# Patient Record
Sex: MALE | URBAN - METROPOLITAN AREA
[De-identification: names, ages, dates, MRNs, and addresses within clinical notes are randomized per-mention and may not be internally consistent; named-entity substitution may affect disease eponyms.]

---

## 2023-03-28 ENCOUNTER — OFFICE (OUTPATIENT)
Facility: LOCATION | Age: 63
Setting detail: OPHTHALMOLOGY
End: 2023-03-28
Payer: COMMERCIAL

## 2023-03-28 ENCOUNTER — RX ONLY (RX ONLY)
Age: 63
End: 2023-03-28

## 2023-03-28 DIAGNOSIS — H40.1133: ICD-10-CM

## 2023-03-28 DIAGNOSIS — E10.9: ICD-10-CM

## 2023-03-28 DIAGNOSIS — E11.9: ICD-10-CM

## 2023-03-28 DIAGNOSIS — H25.13: ICD-10-CM

## 2023-03-28 PROCEDURE — 92020 GONIOSCOPY: CPT | Performed by: OPHTHALMOLOGY

## 2023-03-28 PROCEDURE — 92133 CPTRZD OPH DX IMG PST SGM ON: CPT | Performed by: OPHTHALMOLOGY

## 2023-03-28 PROCEDURE — 92083 EXTENDED VISUAL FIELD XM: CPT | Performed by: OPHTHALMOLOGY

## 2023-03-28 PROCEDURE — 99214 OFFICE O/P EST MOD 30 MIN: CPT | Performed by: OPHTHALMOLOGY

## 2023-03-28 ASSESSMENT — PACHYMETRY
OD_CT_CORRECTION: -1
OS_CT_CORRECTION: -1
OD_CT_UM: 562
OS_CT_UM: 563

## 2023-03-28 ASSESSMENT — REFRACTION_AUTOREFRACTION
OS_SPHERE: +1.75
OS_AXIS: 076
OD_CYLINDER: -4.75
OD_AXIS: 104
OD_SPHERE: +1.25
OS_CYLINDER: -6.50

## 2023-03-28 ASSESSMENT — REFRACTION_CURRENTRX
OD_OVR_VA: 20/
OS_OVR_VA: 20/

## 2023-03-28 ASSESSMENT — KERATOMETRY
OD_AXISANGLE_DEGREES: 104
OD_K2POWER_DIOPTERS: 43.50
OS_K1POWER_DIOPTERS: 48.00
OS_K2POWER_DIOPTERS: 43.50
OD_K1POWER_DIOPTERS: 46.75
OS_AXISANGLE_DEGREES: 075

## 2023-03-28 ASSESSMENT — SPHEQUIV_DERIVED
OD_SPHEQUIV: -1.125
OS_SPHEQUIV: -1.5

## 2023-03-28 ASSESSMENT — VISUAL ACUITY
OS_BCVA: 20/25-2
OD_BCVA: 20/40+1

## 2023-03-28 ASSESSMENT — AXIALLENGTH_DERIVED
OS_AL: 23.3523
OD_AL: 23.4344

## 2023-03-29 ENCOUNTER — RX ONLY (RX ONLY)
Age: 63
End: 2023-03-29

## 2023-03-29 ENCOUNTER — OFFICE (OUTPATIENT)
Facility: LOCATION | Age: 63
Setting detail: OPHTHALMOLOGY
End: 2023-03-29
Payer: COMMERCIAL

## 2023-03-29 DIAGNOSIS — H40.1133: ICD-10-CM

## 2023-03-29 DIAGNOSIS — E11.9: ICD-10-CM

## 2023-03-29 DIAGNOSIS — H25.13: ICD-10-CM

## 2023-03-29 DIAGNOSIS — E10.9: ICD-10-CM

## 2023-03-29 PROCEDURE — 99213 OFFICE O/P EST LOW 20 MIN: CPT | Performed by: OPHTHALMOLOGY

## 2023-03-29 ASSESSMENT — REFRACTION_AUTOREFRACTION
OS_SPHERE: +1.75
OD_SPHERE: +1.25
OS_CYLINDER: -6.50
OS_AXIS: 076
OD_AXIS: 104
OD_CYLINDER: -4.75

## 2023-03-29 ASSESSMENT — TONOMETRY
OD_IOP_MMHG: 16
OS_IOP_MMHG: 16

## 2023-03-29 ASSESSMENT — REFRACTION_CURRENTRX
OD_OVR_VA: 20/
OS_OVR_VA: 20/

## 2023-03-29 ASSESSMENT — KERATOMETRY
OD_AXISANGLE_DEGREES: 104
OS_K1POWER_DIOPTERS: 48.00
OD_K1POWER_DIOPTERS: 46.75
OS_K2POWER_DIOPTERS: 43.50
OS_AXISANGLE_DEGREES: 075
OD_K2POWER_DIOPTERS: 43.50

## 2023-03-29 ASSESSMENT — SPHEQUIV_DERIVED
OD_SPHEQUIV: -1.125
OS_SPHEQUIV: -1.5

## 2023-03-29 ASSESSMENT — CONFRONTATIONAL VISUAL FIELD TEST (CVF)
OD_FINDINGS: FULL
OS_FINDINGS: FULL

## 2023-03-29 ASSESSMENT — AXIALLENGTH_DERIVED
OD_AL: 23.4344
OS_AL: 23.3523

## 2023-03-29 ASSESSMENT — PACHYMETRY
OS_CT_CORRECTION: -1
OS_CT_UM: 563
OD_CT_CORRECTION: -1
OD_CT_UM: 562

## 2023-03-29 ASSESSMENT — VISUAL ACUITY
OS_BCVA: 20/25-2
OD_BCVA: 20/50

## 2023-04-25 ENCOUNTER — OFFICE (OUTPATIENT)
Facility: LOCATION | Age: 63
Setting detail: OPHTHALMOLOGY
End: 2023-04-25
Payer: COMMERCIAL

## 2023-04-25 DIAGNOSIS — H40.1133: ICD-10-CM

## 2023-04-25 DIAGNOSIS — H25.13: ICD-10-CM

## 2023-04-25 PROBLEM — E11.3293 DM TYPE 2; BOTH MILD WITHOUT ME: Status: ACTIVE | Noted: 2023-04-25

## 2023-04-25 PROCEDURE — 92012 INTRM OPH EXAM EST PATIENT: CPT | Performed by: OPHTHALMOLOGY

## 2023-04-25 ASSESSMENT — KERATOMETRY
OD_K1POWER_DIOPTERS: 47.00
OD_K2POWER_DIOPTERS: 43.50
OS_AXISANGLE_DEGREES: 076
OS_K1POWER_DIOPTERS: 48.25
OD_AXISANGLE_DEGREES: 103
OS_K2POWER_DIOPTERS: 43.00

## 2023-04-25 ASSESSMENT — PACHYMETRY
OD_CT_CORRECTION: -1
OS_CT_UM: 563
OD_CT_UM: 562
OS_CT_CORRECTION: -1

## 2023-04-25 ASSESSMENT — VISUAL ACUITY
OD_BCVA: 20/25-2
OS_BCVA: 20/20

## 2023-04-25 ASSESSMENT — AXIALLENGTH_DERIVED
OD_AL: 23.2465
OS_AL: 23.302

## 2023-04-25 ASSESSMENT — REFRACTION_AUTOREFRACTION
OS_CYLINDER: -7.00
OD_CYLINDER: -5.00
OD_AXIS: 104
OD_SPHERE: +1.75
OS_SPHERE: +2.25
OS_AXIS: 077

## 2023-04-25 ASSESSMENT — SPHEQUIV_DERIVED
OD_SPHEQUIV: -0.75
OS_SPHEQUIV: -1.25

## 2023-04-25 ASSESSMENT — REFRACTION_CURRENTRX
OD_OVR_VA: 20/
OS_OVR_VA: 20/

## 2023-04-25 ASSESSMENT — CONFRONTATIONAL VISUAL FIELD TEST (CVF)
OD_FINDINGS: FULL
OS_FINDINGS: FULL

## 2023-07-26 ENCOUNTER — OFFICE (OUTPATIENT)
Facility: LOCATION | Age: 63
Setting detail: OPHTHALMOLOGY
End: 2023-07-26
Payer: COMMERCIAL

## 2023-07-26 DIAGNOSIS — H40.1133: ICD-10-CM

## 2023-07-26 DIAGNOSIS — H25.13: ICD-10-CM

## 2023-07-26 PROCEDURE — 99213 OFFICE O/P EST LOW 20 MIN: CPT | Performed by: OPHTHALMOLOGY

## 2023-07-26 ASSESSMENT — PACHYMETRY
OS_CT_CORRECTION: -1
OD_CT_CORRECTION: -1
OD_CT_UM: 562
OS_CT_UM: 563

## 2023-07-26 ASSESSMENT — SPHEQUIV_DERIVED
OD_SPHEQUIV: -0.5
OS_SPHEQUIV: -0.75

## 2023-07-26 ASSESSMENT — REFRACTION_CURRENTRX
OD_OVR_VA: 20/
OS_OVR_VA: 20/

## 2023-07-26 ASSESSMENT — VISUAL ACUITY
OS_BCVA: 20/20
OD_BCVA: 20/30-2

## 2023-07-26 ASSESSMENT — KERATOMETRY
OD_K2POWER_DIOPTERS: 44.00
OD_AXISANGLE_DEGREES: 101
OS_K2POWER_DIOPTERS: 43.00
OS_K1POWER_DIOPTERS: 48.00
OS_AXISANGLE_DEGREES: 074
OD_K1POWER_DIOPTERS: 47.25

## 2023-07-26 ASSESSMENT — REFRACTION_AUTOREFRACTION
OS_SPHERE: +2.50
OD_CYLINDER: -4.50
OS_AXIS: 075
OD_AXIS: 100
OS_CYLINDER: -6.50
OD_SPHERE: +1.75

## 2023-07-26 ASSESSMENT — AXIALLENGTH_DERIVED
OD_AL: 23.0204
OS_AL: 23.1576

## 2023-08-24 ENCOUNTER — OFFICE (OUTPATIENT)
Facility: LOCATION | Age: 63
Setting detail: OPHTHALMOLOGY
End: 2023-08-24
Payer: COMMERCIAL

## 2023-08-24 DIAGNOSIS — H40.1133: ICD-10-CM

## 2023-08-24 PROCEDURE — 99212 OFFICE O/P EST SF 10 MIN: CPT | Performed by: OPHTHALMOLOGY

## 2023-08-24 ASSESSMENT — KERATOMETRY
OS_K1POWER_DIOPTERS: 48.00
OD_AXISANGLE_DEGREES: 101
OD_K1POWER_DIOPTERS: 47.00
OS_K2POWER_DIOPTERS: 43.25
OS_AXISANGLE_DEGREES: 73
OD_K2POWER_DIOPTERS: 43.75

## 2023-08-24 ASSESSMENT — VISUAL ACUITY
OS_BCVA: 20/20
OD_BCVA: 20/25-2

## 2023-08-24 ASSESSMENT — REFRACTION_CURRENTRX
OS_OVR_VA: 20/
OD_OVR_VA: 20/

## 2023-08-24 ASSESSMENT — REFRACTION_AUTOREFRACTION
OD_SPHERE: +1.25
OD_AXIS: 102
OD_CYLINDER: -4.75
OS_CYLINDER: -6.75
OS_SPHERE: +2.25
OS_AXIS: 75

## 2023-08-24 ASSESSMENT — PACHYMETRY
OD_CT_UM: 562
OD_CT_CORRECTION: -1
OS_CT_UM: 563
OS_CT_CORRECTION: -1

## 2023-08-24 ASSESSMENT — AXIALLENGTH_DERIVED
OD_AL: 23.3441
OS_AL: 23.2546

## 2023-08-24 ASSESSMENT — SPHEQUIV_DERIVED
OS_SPHEQUIV: -1.125
OD_SPHEQUIV: -1.125

## 2023-09-14 ENCOUNTER — OFFICE (OUTPATIENT)
Facility: LOCATION | Age: 63
Setting detail: OPHTHALMOLOGY
End: 2023-09-14
Payer: MEDICARE

## 2023-09-14 DIAGNOSIS — H40.1133: ICD-10-CM

## 2023-09-14 PROCEDURE — 99213 OFFICE O/P EST LOW 20 MIN: CPT | Performed by: OPHTHALMOLOGY

## 2023-09-14 ASSESSMENT — SPHEQUIV_DERIVED
OS_SPHEQUIV: -1.125
OD_SPHEQUIV: -1.125

## 2023-09-14 ASSESSMENT — VISUAL ACUITY
OS_BCVA: 20/20
OD_BCVA: 20/20-1

## 2023-09-14 ASSESSMENT — KERATOMETRY
OS_AXISANGLE_DEGREES: 73
OS_K1POWER_DIOPTERS: 48.00
OD_AXISANGLE_DEGREES: 101
OD_K1POWER_DIOPTERS: 47.00
OD_K2POWER_DIOPTERS: 43.75
OS_K2POWER_DIOPTERS: 43.25

## 2023-09-14 ASSESSMENT — CONFRONTATIONAL VISUAL FIELD TEST (CVF)
OS_FINDINGS: FULL
OD_FINDINGS: FULL

## 2023-09-14 ASSESSMENT — REFRACTION_CURRENTRX
OD_OVR_VA: 20/
OS_OVR_VA: 20/

## 2023-09-14 ASSESSMENT — REFRACTION_AUTOREFRACTION
OD_AXIS: 102
OS_AXIS: 75
OD_CYLINDER: -4.75
OS_CYLINDER: -6.75
OD_SPHERE: +1.25
OS_SPHERE: +2.25

## 2023-09-14 ASSESSMENT — AXIALLENGTH_DERIVED
OD_AL: 23.3441
OS_AL: 23.2546

## 2024-01-16 ENCOUNTER — OFFICE (OUTPATIENT)
Facility: LOCATION | Age: 64
Setting detail: OPHTHALMOLOGY
End: 2024-01-16
Payer: MEDICARE

## 2024-01-16 DIAGNOSIS — H40.1133: ICD-10-CM

## 2024-01-16 PROCEDURE — 92012 INTRM OPH EXAM EST PATIENT: CPT | Performed by: OPHTHALMOLOGY

## 2024-01-16 ASSESSMENT — REFRACTION_AUTOREFRACTION
OD_AXIS: 102
OD_CYLINDER: -4.75
OD_SPHERE: +1.75
OS_SPHERE: +2.25
OS_AXIS: 76
OS_CYLINDER: -6.75

## 2024-01-16 ASSESSMENT — REFRACTION_CURRENTRX
OD_SPHERE: +1.25
OS_SPHERE: +1.50
OS_CYLINDER: -6.00
OD_CYLINDER: -5.25
OD_OVR_VA: 20/
OS_AXIS: 075
OD_AXIS: 111
OS_OVR_VA: 20/

## 2024-01-16 ASSESSMENT — SPHEQUIV_DERIVED
OS_SPHEQUIV: -1.125
OD_SPHEQUIV: -0.625

## 2024-01-23 ENCOUNTER — OFFICE (OUTPATIENT)
Facility: LOCATION | Age: 64
Setting detail: OPHTHALMOLOGY
End: 2024-01-23
Payer: MEDICARE

## 2024-01-23 ENCOUNTER — RX ONLY (RX ONLY)
Age: 64
End: 2024-01-23

## 2024-01-23 DIAGNOSIS — H40.1133: ICD-10-CM

## 2024-01-23 PROCEDURE — 99213 OFFICE O/P EST LOW 20 MIN: CPT | Performed by: OPHTHALMOLOGY

## 2024-01-23 ASSESSMENT — REFRACTION_CURRENTRX
OS_CYLINDER: -6.00
OD_CYLINDER: -5.25
OD_OVR_VA: 20/
OD_AXIS: 111
OS_OVR_VA: 20/
OD_SPHERE: +1.25
OS_AXIS: 075
OS_SPHERE: +1.50

## 2024-01-23 ASSESSMENT — SPHEQUIV_DERIVED
OS_SPHEQUIV: -1.125
OD_SPHEQUIV: -0.875

## 2024-01-23 ASSESSMENT — CONFRONTATIONAL VISUAL FIELD TEST (CVF)
OS_FINDINGS: FULL
OD_FINDINGS: FULL

## 2024-01-23 ASSESSMENT — REFRACTION_AUTOREFRACTION
OD_AXIS: 100
OS_AXIS: 075
OS_CYLINDER: -6.75
OD_SPHERE: +1.75
OS_SPHERE: +2.25
OD_CYLINDER: -5.25

## 2024-02-06 ENCOUNTER — OFFICE (OUTPATIENT)
Facility: LOCATION | Age: 64
Setting detail: OPHTHALMOLOGY
End: 2024-02-06
Payer: MEDICARE

## 2024-02-06 DIAGNOSIS — H40.1133: ICD-10-CM

## 2024-02-06 PROCEDURE — 99213 OFFICE O/P EST LOW 20 MIN: CPT | Performed by: OPHTHALMOLOGY

## 2024-02-06 ASSESSMENT — REFRACTION_AUTOREFRACTION
OS_SPHERE: +2.25
OD_SPHERE: +1.75
OD_AXIS: 100
OD_CYLINDER: -5.25
OS_CYLINDER: -7.50
OS_AXIS: 080

## 2024-02-06 ASSESSMENT — REFRACTION_CURRENTRX
OD_OVR_VA: 20/
OS_OVR_VA: 20/
OS_SPHERE: +1.50
OS_AXIS: 075
OD_CYLINDER: -5.25
OS_CYLINDER: -6.00
OD_SPHERE: +1.25
OD_AXIS: 111

## 2024-02-06 ASSESSMENT — SPHEQUIV_DERIVED
OD_SPHEQUIV: -0.875
OS_SPHEQUIV: -1.5

## 2024-03-05 ENCOUNTER — OFFICE (OUTPATIENT)
Facility: LOCATION | Age: 64
Setting detail: OPHTHALMOLOGY
End: 2024-03-05
Payer: MEDICARE

## 2024-03-05 DIAGNOSIS — H40.1133: ICD-10-CM

## 2024-03-05 PROCEDURE — 92012 INTRM OPH EXAM EST PATIENT: CPT | Performed by: OPHTHALMOLOGY

## 2024-03-05 ASSESSMENT — REFRACTION_CURRENTRX
OS_AXIS: 075
OS_SPHERE: +1.50
OS_CYLINDER: -6.00
OD_AXIS: 111
OD_CYLINDER: -5.25
OS_OVR_VA: 20/
OD_OVR_VA: 20/
OD_SPHERE: +1.25

## 2024-05-06 ENCOUNTER — OFFICE (OUTPATIENT)
Facility: LOCATION | Age: 64
Setting detail: OPHTHALMOLOGY
End: 2024-05-06
Payer: MEDICARE

## 2024-05-06 DIAGNOSIS — H40.1133: ICD-10-CM

## 2024-05-06 PROCEDURE — 92133 CPTRZD OPH DX IMG PST SGM ON: CPT | Performed by: OPHTHALMOLOGY

## 2024-05-06 PROCEDURE — 92083 EXTENDED VISUAL FIELD XM: CPT | Performed by: OPHTHALMOLOGY

## 2024-05-06 PROCEDURE — 92014 COMPRE OPH EXAM EST PT 1/>: CPT | Performed by: OPHTHALMOLOGY

## 2024-05-06 ASSESSMENT — CONFRONTATIONAL VISUAL FIELD TEST (CVF)
OS_FINDINGS: FULL
OD_FINDINGS: FULL

## 2024-05-08 ENCOUNTER — OFFICE (OUTPATIENT)
Facility: LOCATION | Age: 64
Setting detail: OPHTHALMOLOGY
End: 2024-05-08
Payer: MEDICARE

## 2024-05-08 DIAGNOSIS — H40.1133: ICD-10-CM

## 2024-05-08 PROCEDURE — 99213 OFFICE O/P EST LOW 20 MIN: CPT | Performed by: OPHTHALMOLOGY

## 2024-05-08 ASSESSMENT — CONFRONTATIONAL VISUAL FIELD TEST (CVF)
OS_FINDINGS: FULL
OD_FINDINGS: FULL

## 2024-08-22 ENCOUNTER — OFFICE (OUTPATIENT)
Facility: LOCATION | Age: 64
Setting detail: OPHTHALMOLOGY
End: 2024-08-22
Payer: MEDICARE

## 2024-08-22 DIAGNOSIS — H40.1133: ICD-10-CM

## 2024-08-22 PROBLEM — Z00.00 ENCOUNTER FOR PREVENTIVE HEALTH EXAMINATION: Status: ACTIVE | Noted: 2024-08-22

## 2024-08-22 PROCEDURE — 99213 OFFICE O/P EST LOW 20 MIN: CPT | Performed by: OPHTHALMOLOGY

## 2024-08-22 ASSESSMENT — CONFRONTATIONAL VISUAL FIELD TEST (CVF)
OD_FINDINGS: FULL
OS_FINDINGS: FULL

## 2024-08-26 ENCOUNTER — APPOINTMENT (OUTPATIENT)
Dept: UROLOGY | Facility: CLINIC | Age: 64
End: 2024-08-26
Payer: MEDICARE

## 2024-08-26 VITALS
BODY MASS INDEX: 22.6 KG/M2 | RESPIRATION RATE: 16 BRPM | TEMPERATURE: 98 F | OXYGEN SATURATION: 99 % | WEIGHT: 144 LBS | SYSTOLIC BLOOD PRESSURE: 160 MMHG | DIASTOLIC BLOOD PRESSURE: 85 MMHG | HEIGHT: 67 IN | HEART RATE: 89 BPM

## 2024-08-26 DIAGNOSIS — C61 MALIGNANT NEOPLASM OF PROSTATE: ICD-10-CM

## 2024-08-26 DIAGNOSIS — M54.50 LOW BACK PAIN, UNSPECIFIED: ICD-10-CM

## 2024-08-26 DIAGNOSIS — R35.0 FREQUENCY OF MICTURITION: ICD-10-CM

## 2024-08-26 DIAGNOSIS — M62.81 MUSCLE WEAKNESS (GENERALIZED): ICD-10-CM

## 2024-08-26 PROCEDURE — 99204 OFFICE O/P NEW MOD 45 MIN: CPT

## 2024-08-26 PROCEDURE — G2211 COMPLEX E/M VISIT ADD ON: CPT

## 2024-08-26 RX ORDER — METHENAMINE, SODIUM PHOSPHATE, MONOBASIC, MONOHYDRATE, PHENYL SALICYLATE, METHYLENE BLUE, AND HYOSCYAMINE SULFATE 118; 40.8; 36; 10; .12 MG/1; MG/1; MG/1; MG/1; MG/1
118 CAPSULE ORAL
Qty: 24 | Refills: 6 | Status: ACTIVE | COMMUNITY
Start: 2024-08-26 | End: 1900-01-01

## 2024-08-27 PROBLEM — M62.81 MUSCULAR WEAKNESS: Status: ACTIVE | Noted: 2024-08-26

## 2024-08-27 LAB
APPEARANCE: CLEAR
BILIRUBIN URINE: NEGATIVE
BLOOD URINE: NEGATIVE
COLOR: YELLOW
GLUCOSE QUALITATIVE U: >=1000 MG/DL
KETONES URINE: ABNORMAL MG/DL
LEUKOCYTE ESTERASE URINE: NEGATIVE
NITRITE URINE: NEGATIVE
PH URINE: 6
PROTEIN URINE: NEGATIVE MG/DL
PSA SERPL-MCNC: 0.06 NG/ML
SPECIFIC GRAVITY URINE: >1.03
UROBILINOGEN URINE: 0.2 MG/DL

## 2024-08-27 NOTE — HISTORY OF PRESENT ILLNESS
[FreeTextEntry1] : CaP 2013  got brachytherapy in 2015 psa  improved from 4.3 to 2.6  then 0.98  dajuan  last couple years frequency of urination al low back pain  mother passed away 5 months ago and sister 10 days ago   cheyanne gale dribbling   nocturia x 4

## 2024-09-25 ENCOUNTER — APPOINTMENT (OUTPATIENT)
Dept: UROLOGY | Facility: CLINIC | Age: 64
End: 2024-09-25

## 2024-11-26 ENCOUNTER — OFFICE (OUTPATIENT)
Facility: LOCATION | Age: 64
Setting detail: OPHTHALMOLOGY
End: 2024-11-26
Payer: MEDICARE

## 2024-11-26 DIAGNOSIS — H40.1133: ICD-10-CM

## 2024-11-26 PROCEDURE — 99213 OFFICE O/P EST LOW 20 MIN: CPT | Performed by: OPHTHALMOLOGY

## 2024-11-26 PROCEDURE — 92083 EXTENDED VISUAL FIELD XM: CPT | Performed by: OPHTHALMOLOGY

## 2024-11-26 ASSESSMENT — KERATOMETRY
OD_AXISANGLE_DEGREES: 100
OS_AXISANGLE_DEGREES: 080
OD_K1POWER_DIOPTERS: 47.50
OS_K1POWER_DIOPTERS: 48.75
OS_K2POWER_DIOPTERS: 43.25
OD_K2POWER_DIOPTERS: 43.25

## 2024-11-26 ASSESSMENT — TONOMETRY
OD_IOP_MMHG: 14
OS_IOP_MMHG: 14

## 2024-11-26 ASSESSMENT — PACHYMETRY
OS_CT_CORRECTION: -1
OD_CT_UM: 562
OD_CT_CORRECTION: -1
OS_CT_UM: 563

## 2024-11-26 ASSESSMENT — REFRACTION_AUTOREFRACTION
OD_AXIS: 100
OS_CYLINDER: -8.00
OD_CYLINDER: -5.75
OD_SPHERE: +2.00
OS_AXIS: 080
OS_SPHERE: +2.75

## 2024-11-26 ASSESSMENT — REFRACTION_CURRENTRX
OD_AXIS: 108
OD_SPHERE: +1.25
OS_CYLINDER: -6.00
OS_AXIS: 076
OD_CYLINDER: -5.25
OS_SPHERE: +1.50
OD_OVR_VA: 20/
OS_OVR_VA: 20/

## 2024-11-26 ASSESSMENT — VISUAL ACUITY
OD_BCVA: 20/30-2
OS_BCVA: 20/20

## 2024-11-26 ASSESSMENT — CONFRONTATIONAL VISUAL FIELD TEST (CVF)
OS_FINDINGS: FULL
OD_FINDINGS: FULL

## 2025-03-03 ENCOUNTER — OFFICE (OUTPATIENT)
Facility: LOCATION | Age: 65
Setting detail: OPHTHALMOLOGY
End: 2025-03-03
Payer: MEDICARE

## 2025-03-03 DIAGNOSIS — H25.13: ICD-10-CM

## 2025-03-03 DIAGNOSIS — H40.1133: ICD-10-CM

## 2025-03-03 DIAGNOSIS — E11.3293: ICD-10-CM

## 2025-03-03 PROCEDURE — 92012 INTRM OPH EXAM EST PATIENT: CPT | Performed by: OPHTHALMOLOGY

## 2025-03-03 ASSESSMENT — REFRACTION_AUTOREFRACTION
OD_AXIS: 100
OS_AXIS: 075
OD_SPHERE: +2.25
OS_CYLINDER: -7.25
OS_SPHERE: +3.25
OD_CYLINDER: -5.50

## 2025-03-03 ASSESSMENT — REFRACTION_CURRENTRX
OD_CYLINDER: -5.25
OS_CYLINDER: -6.00
OD_AXIS: 115
OD_SPHERE: +1.25
OS_AXIS: 075
OS_OVR_VA: 20/
OD_OVR_VA: 20/
OS_SPHERE: +1.50

## 2025-03-03 ASSESSMENT — PACHYMETRY
OD_CT_UM: 562
OS_CT_UM: 563
OS_CT_CORRECTION: -1
OD_CT_CORRECTION: -1

## 2025-03-03 ASSESSMENT — KERATOMETRY
OS_AXISANGLE_DEGREES: 165
OD_K2POWER_DIOPTERS: 47.00
OD_AXISANGLE_DEGREES: 010
OS_K1POWER_DIOPTERS: 43.00
OS_K2POWER_DIOPTERS: 48.50
OD_K1POWER_DIOPTERS: 43.25

## 2025-03-03 ASSESSMENT — CONFRONTATIONAL VISUAL FIELD TEST (CVF)
OS_FINDINGS: FULL
OD_FINDINGS: FULL

## 2025-03-03 ASSESSMENT — VISUAL ACUITY
OD_BCVA: 20/30-2
OS_BCVA: 20/25+2

## 2025-03-03 ASSESSMENT — TONOMETRY: OS_IOP_MMHG: 17

## 2025-03-06 ENCOUNTER — OFFICE (OUTPATIENT)
Facility: LOCATION | Age: 65
Setting detail: OPHTHALMOLOGY
End: 2025-03-06
Payer: MEDICARE

## 2025-03-06 DIAGNOSIS — H40.1113: ICD-10-CM

## 2025-03-06 PROCEDURE — 65855 TRABECULOPLASTY LASER SURG: CPT | Mod: RT | Performed by: OPHTHALMOLOGY

## 2025-03-06 ASSESSMENT — REFRACTION_AUTOREFRACTION
OD_CYLINDER: -5.50
OD_AXIS: 100
OS_SPHERE: +3.25
OS_CYLINDER: -7.25
OS_AXIS: 075
OD_SPHERE: +2.25

## 2025-03-06 ASSESSMENT — KERATOMETRY
OS_K1POWER_DIOPTERS: 43.00
OS_AXISANGLE_DEGREES: 165
OD_AXISANGLE_DEGREES: 010
OD_K2POWER_DIOPTERS: 47.00
OS_K2POWER_DIOPTERS: 48.50
OD_K1POWER_DIOPTERS: 43.25

## 2025-03-06 ASSESSMENT — REFRACTION_CURRENTRX
OD_SPHERE: +1.25
OS_CYLINDER: -6.00
OD_OVR_VA: 20/
OD_CYLINDER: -5.25
OS_AXIS: 075
OD_AXIS: 115
OS_OVR_VA: 20/
OS_SPHERE: +1.50

## 2025-03-06 ASSESSMENT — TONOMETRY: OD_IOP_MMHG: 19

## 2025-03-06 ASSESSMENT — PACHYMETRY
OD_CT_CORRECTION: -1
OS_CT_UM: 563
OD_CT_UM: 562
OS_CT_CORRECTION: -1

## 2025-03-06 ASSESSMENT — CONFRONTATIONAL VISUAL FIELD TEST (CVF)
OS_FINDINGS: FULL
OD_FINDINGS: FULL

## 2025-03-06 ASSESSMENT — VISUAL ACUITY
OS_BCVA: 20/25-1
OD_BCVA: 20/30-2

## 2025-03-13 ENCOUNTER — OFFICE (OUTPATIENT)
Facility: LOCATION | Age: 65
Setting detail: OPHTHALMOLOGY
End: 2025-03-13
Payer: MEDICARE

## 2025-03-13 DIAGNOSIS — H40.1123: ICD-10-CM

## 2025-03-13 PROBLEM — H40.1113 POAG; RIGHT EYE SEVERE, LEFT EYE SEVERE: Status: ACTIVE | Noted: 2025-03-13

## 2025-03-13 PROCEDURE — 65855 TRABECULOPLASTY LASER SURG: CPT | Mod: 79,LT | Performed by: OPHTHALMOLOGY

## 2025-03-13 ASSESSMENT — KERATOMETRY
OD_K2POWER_DIOPTERS: 47.00
OD_K1POWER_DIOPTERS: 43.25
OD_AXISANGLE_DEGREES: 010
OS_K1POWER_DIOPTERS: 43.00
OS_AXISANGLE_DEGREES: 165
OS_K2POWER_DIOPTERS: 48.50

## 2025-03-13 ASSESSMENT — REFRACTION_CURRENTRX
OD_AXIS: 115
OS_OVR_VA: 20/
OS_SPHERE: +1.50
OD_CYLINDER: -5.25
OD_SPHERE: +1.25
OS_AXIS: 075
OS_CYLINDER: -6.00
OD_OVR_VA: 20/

## 2025-03-13 ASSESSMENT — TONOMETRY
OS_IOP_MMHG: 16
OD_IOP_MMHG: 16

## 2025-03-13 ASSESSMENT — REFRACTION_AUTOREFRACTION
OS_AXIS: 075
OS_CYLINDER: -7.25
OD_SPHERE: +2.25
OD_CYLINDER: -5.50
OS_SPHERE: +3.25
OD_AXIS: 100

## 2025-03-13 ASSESSMENT — PACHYMETRY
OS_CT_UM: 563
OD_CT_UM: 562
OS_CT_CORRECTION: -1
OD_CT_CORRECTION: -1

## 2025-03-13 ASSESSMENT — VISUAL ACUITY
OS_BCVA: 20/25-2
OD_BCVA: 20/30-1

## 2025-03-13 ASSESSMENT — CONFRONTATIONAL VISUAL FIELD TEST (CVF)
OD_FINDINGS: FULL
OS_FINDINGS: FULL

## 2025-04-17 ENCOUNTER — OFFICE (OUTPATIENT)
Facility: LOCATION | Age: 65
Setting detail: OPHTHALMOLOGY
End: 2025-04-17
Payer: MEDICARE

## 2025-04-17 DIAGNOSIS — H40.1133: ICD-10-CM

## 2025-04-17 PROCEDURE — 99213 OFFICE O/P EST LOW 20 MIN: CPT | Performed by: OPHTHALMOLOGY

## 2025-04-17 ASSESSMENT — PACHYMETRY
OS_CT_CORRECTION: -1
OS_CT_UM: 563
OD_CT_CORRECTION: -1
OD_CT_UM: 562

## 2025-04-17 ASSESSMENT — REFRACTION_AUTOREFRACTION
OD_SPHERE: +1.50
OS_AXIS: 074
OS_SPHERE: +2.25
OD_AXIS: 100
OD_CYLINDER: -5.75
OS_CYLINDER: -8.00

## 2025-04-17 ASSESSMENT — KERATOMETRY
OS_K2POWER_DIOPTERS: 48.75
OD_K1POWER_DIOPTERS: 43.25
OD_K2POWER_DIOPTERS: 47.75
OS_AXISANGLE_DEGREES: 167
OD_AXISANGLE_DEGREES: 012
OS_K1POWER_DIOPTERS: 43.25

## 2025-04-17 ASSESSMENT — REFRACTION_CURRENTRX
OD_SPHERE: +1.25
OS_CYLINDER: -6.00
OS_OVR_VA: 20/
OD_AXIS: 115
OD_OVR_VA: 20/
OS_AXIS: 075
OS_SPHERE: +1.50
OD_CYLINDER: -5.25

## 2025-04-17 ASSESSMENT — VISUAL ACUITY
OS_BCVA: 20/20
OD_BCVA: 20/30

## 2025-04-17 ASSESSMENT — TONOMETRY
OD_IOP_MMHG: 19
OS_IOP_MMHG: 16

## 2025-06-17 ENCOUNTER — OFFICE (OUTPATIENT)
Facility: LOCATION | Age: 65
Setting detail: OPHTHALMOLOGY
End: 2025-06-17
Payer: MEDICARE

## 2025-06-17 DIAGNOSIS — H40.1133: ICD-10-CM

## 2025-06-17 DIAGNOSIS — H25.13: ICD-10-CM

## 2025-06-17 PROCEDURE — 99213 OFFICE O/P EST LOW 20 MIN: CPT | Performed by: OPHTHALMOLOGY

## 2025-06-17 ASSESSMENT — PACHYMETRY
OS_CT_CORRECTION: -1
OD_CT_UM: 562
OD_CT_CORRECTION: -1
OS_CT_UM: 563

## 2025-06-17 ASSESSMENT — VISUAL ACUITY
OS_BCVA: 20/20
OD_BCVA: 20/30-2

## 2025-06-17 ASSESSMENT — REFRACTION_CURRENTRX
OS_AXIS: 072
OD_AXIS: 106
OS_OVR_VA: 20/
OS_CYLINDER: -6.00
OD_SPHERE: +1.25
OS_SPHERE: +1.50
OD_CYLINDER: -5.25
OD_OVR_VA: 20/

## 2025-06-17 ASSESSMENT — CONFRONTATIONAL VISUAL FIELD TEST (CVF)
OS_FINDINGS: FULL
OD_FINDINGS: FULL

## 2025-06-17 ASSESSMENT — KERATOMETRY
OD_AXISANGLE_DEGREES: 017
OS_K2POWER_DIOPTERS: 48.50
OS_K1POWER_DIOPTERS: 43.00
OD_K1POWER_DIOPTERS: 43.75
OD_K2POWER_DIOPTERS: 47.50
OS_AXISANGLE_DEGREES: 169

## 2025-06-17 ASSESSMENT — REFRACTION_AUTOREFRACTION
OD_SPHERE: +1.25
OS_AXIS: 075
OD_AXIS: 102
OD_CYLINDER: -5.25
OS_CYLINDER: -7.75
OS_SPHERE: +2.00

## 2025-07-29 ENCOUNTER — OFFICE (OUTPATIENT)
Facility: LOCATION | Age: 65
Setting detail: OPHTHALMOLOGY
End: 2025-07-29
Payer: MEDICARE

## 2025-07-29 DIAGNOSIS — H25.11: ICD-10-CM

## 2025-07-29 DIAGNOSIS — H25.13: ICD-10-CM

## 2025-07-29 PROBLEM — H25.12 CATARACT SENILE NUCLEAR SCLEROSIS; RIGHT EYE, LEFT EYE, BOTH EYES: Status: ACTIVE | Noted: 2025-07-29

## 2025-07-29 PROCEDURE — 99214 OFFICE O/P EST MOD 30 MIN: CPT | Performed by: OPHTHALMOLOGY

## 2025-07-29 PROCEDURE — 92136 OPHTHALMIC BIOMETRY: CPT | Mod: RT | Performed by: OPHTHALMOLOGY

## 2025-07-29 ASSESSMENT — REFRACTION_AUTOREFRACTION
OD_AXIS: 101
OS_AXIS: 073
OD_SPHERE: +2.25
OD_CYLINDER: -5.50
OS_SPHERE: +2.75
OS_CYLINDER: -7.75

## 2025-07-29 ASSESSMENT — KERATOMETRY
OS_K1K2_AVERAGE: 46
OD_AXISANGLE_DEGREES: 015
OD_K2POWER_DIOPTERS: .50
OD_CYLAXISANGLE_DEGREES: 015
OS_K1POWER_DIOPTERS: 43.25
OS_AXISANGLE_DEGREES: 78
OD_K1K2_AVERAGE: 21.8
OS_K2POWER_DIOPTERS: 48.75
OS_CYLAXISANGLE_DEGREES: 168
OS_AXISANGLE2_DEGREES: 168
OD_K2POWER_DIOPTERS: .50
OD_AXISANGLE2_DEGREES: 105
OD_CYLPOWER_DEGREES: 42.6
OD_K1POWER_DIOPTERS: 43.10
OS_K1POWER_DIOPTERS: 43.25
OD_AXISANGLE_DEGREES: 015
OS_AXISANGLE_DEGREES: 168
OS_K2POWER_DIOPTERS: 48.75
OD_K1POWER_DIOPTERS: 43.10
OS_CYLPOWER_DEGREES: 5.5

## 2025-07-29 ASSESSMENT — PACHYMETRY
OS_CT_UM: 563
OD_CT_CORRECTION: -1
OD_CT_UM: 562
OS_CT_CORRECTION: -1

## 2025-07-29 ASSESSMENT — REFRACTION_CURRENTRX
OD_CYLINDER: -5.25
OD_AXIS: 106
OS_CYLINDER: -6.00
OD_SPHERE: +1.25
OS_OVR_VA: 20/
OS_SPHERE: +1.50
OD_OVR_VA: 20/
OS_AXIS: 072

## 2025-07-29 ASSESSMENT — VISUAL ACUITY
OD_BCVA: 20/30-1
OS_BCVA: 20/25-1

## 2025-07-29 ASSESSMENT — CONFRONTATIONAL VISUAL FIELD TEST (CVF)
OD_FINDINGS: FULL
OS_FINDINGS: FULL

## 2025-08-06 ENCOUNTER — OFFICE (OUTPATIENT)
Facility: LOCATION | Age: 65
Setting detail: OPHTHALMOLOGY
End: 2025-08-06
Payer: MEDICARE

## 2025-08-06 DIAGNOSIS — H25.13: ICD-10-CM

## 2025-08-06 DIAGNOSIS — H40.1133: ICD-10-CM

## 2025-08-06 DIAGNOSIS — Z01.818: ICD-10-CM

## 2025-08-06 DIAGNOSIS — E11.3293: ICD-10-CM

## 2025-08-06 PROCEDURE — 99212 OFFICE O/P EST SF 10 MIN: CPT

## 2025-08-11 ENCOUNTER — ASC (OUTPATIENT)
Dept: URBAN - METROPOLITAN AREA SURGERY 8 | Facility: SURGERY | Age: 65
Setting detail: OPHTHALMOLOGY
End: 2025-08-11
Payer: MEDICARE

## 2025-08-11 DIAGNOSIS — H25.11: ICD-10-CM

## 2025-08-11 DIAGNOSIS — H40.1113: ICD-10-CM

## 2025-08-11 PROCEDURE — 66984 XCAPSL CTRC RMVL W/O ECP: CPT | Mod: RT | Performed by: OPHTHALMOLOGY

## 2025-08-11 PROCEDURE — 66174 TRLUML DIL AQ O/F CAN W/O ST: CPT | Mod: RT | Performed by: OPHTHALMOLOGY

## 2025-08-12 ENCOUNTER — OFFICE (OUTPATIENT)
Facility: LOCATION | Age: 65
Setting detail: OPHTHALMOLOGY
End: 2025-08-12
Payer: MEDICARE

## 2025-08-12 DIAGNOSIS — Z96.1: ICD-10-CM

## 2025-08-12 DIAGNOSIS — H40.1113: ICD-10-CM

## 2025-08-12 PROCEDURE — 99024 POSTOP FOLLOW-UP VISIT: CPT | Performed by: OPHTHALMOLOGY

## 2025-08-12 ASSESSMENT — REFRACTION_AUTOREFRACTION
OD_AXIS: 092
OS_CYLINDER: -7.75
OS_AXIS: 072
OD_SPHERE: +2.50
OS_SPHERE: +2.75
OD_CYLINDER: -5.00

## 2025-08-12 ASSESSMENT — PACHYMETRY
OS_CT_UM: 563
OD_CT_UM: 562
OS_CT_CORRECTION: -1
OD_CT_CORRECTION: -1

## 2025-08-12 ASSESSMENT — KERATOMETRY
OD_K2POWER_DIOPTERS: 47.25
OD_K1POWER_DIOPTERS: 43.25
OS_AXISANGLE_DEGREES: 166
OD_AXISANGLE_DEGREES: 006
OS_K2POWER_DIOPTERS: 48.50
OS_K1POWER_DIOPTERS: 43.00

## 2025-08-12 ASSESSMENT — TONOMETRY: OD_IOP_MMHG: 20

## 2025-08-12 ASSESSMENT — CORNEAL EDEMA - MICROCYSTIC EPITHELIAL EDEMA (MCE): OD_MCE: 1+

## 2025-08-12 ASSESSMENT — CONFRONTATIONAL VISUAL FIELD TEST (CVF)
OS_FINDINGS: FULL
OD_FINDINGS: FULL

## 2025-08-12 ASSESSMENT — VISUAL ACUITY
OD_BCVA: 20/50
OS_BCVA: 20/50

## 2025-08-15 ENCOUNTER — OFFICE (OUTPATIENT)
Facility: LOCATION | Age: 65
Setting detail: OPHTHALMOLOGY
End: 2025-08-15
Payer: MEDICARE

## 2025-08-15 DIAGNOSIS — H25.12: ICD-10-CM

## 2025-08-15 DIAGNOSIS — Z96.1: ICD-10-CM

## 2025-08-15 DIAGNOSIS — H40.1113: ICD-10-CM

## 2025-08-15 DIAGNOSIS — H40.1123: ICD-10-CM

## 2025-08-15 PROCEDURE — 99024 POSTOP FOLLOW-UP VISIT: CPT | Performed by: OPHTHALMOLOGY

## 2025-08-15 ASSESSMENT — VISUAL ACUITY
OD_BCVA: 20/40-2
OS_BCVA: 20/50

## 2025-08-15 ASSESSMENT — REFRACTION_AUTOREFRACTION
OD_CYLINDER: -4.00
OS_SPHERE: +3.25
OS_AXIS: 080
OS_CYLINDER: -8.00
OD_SPHERE: +2.00
OD_AXIS: 103

## 2025-08-15 ASSESSMENT — KERATOMETRY
OD_AXISANGLE_DEGREES: 009
OS_K2POWER_DIOPTERS: 48.50
OD_K2POWER_DIOPTERS: 47.25
OS_K1POWER_DIOPTERS: 42.25
OS_AXISANGLE_DEGREES: 171
OD_K1POWER_DIOPTERS: 43.25

## 2025-08-15 ASSESSMENT — CORNEAL EDEMA - MICROCYSTIC EPITHELIAL EDEMA (MCE): OD_MCE: 1+

## 2025-08-15 ASSESSMENT — PACHYMETRY
OD_CT_CORRECTION: -1
OS_CT_CORRECTION: -1
OS_CT_UM: 563
OD_CT_UM: 562

## 2025-08-26 ENCOUNTER — OFFICE (OUTPATIENT)
Facility: LOCATION | Age: 65
Setting detail: OPHTHALMOLOGY
End: 2025-08-26
Payer: MEDICARE

## 2025-08-26 ENCOUNTER — RX ONLY (RX ONLY)
Age: 65
End: 2025-08-26

## 2025-08-26 DIAGNOSIS — H25.12: ICD-10-CM

## 2025-08-26 DIAGNOSIS — H26.491: ICD-10-CM

## 2025-08-26 DIAGNOSIS — Z96.1: ICD-10-CM

## 2025-08-26 DIAGNOSIS — H40.1113: ICD-10-CM

## 2025-08-26 DIAGNOSIS — H40.1123: ICD-10-CM

## 2025-08-26 PROBLEM — Z01.818 ENCOUNTER FOR OTHER PREPROCEDURAL EXAMINATION: Status: ACTIVE | Noted: 2025-08-06

## 2025-08-26 PROCEDURE — 99024 POSTOP FOLLOW-UP VISIT: CPT | Performed by: OPHTHALMOLOGY

## 2025-08-26 ASSESSMENT — REFRACTION_AUTOREFRACTION
OS_CYLINDER: -8.00
OD_SPHERE: +2.50
OS_SPHERE: +3.25
OD_AXIS: 098
OS_AXIS: 078
OD_CYLINDER: -5.00

## 2025-08-26 ASSESSMENT — PACHYMETRY
OS_CT_CORRECTION: -1
OD_CT_CORRECTION: -1
OD_CT_UM: 562
OS_CT_UM: 563

## 2025-08-26 ASSESSMENT — CORNEAL EDEMA CLINICAL DESCRIPTION: OD_CORNEALEDEMA: ABSENT

## 2025-08-26 ASSESSMENT — KERATOMETRY
OS_K1POWER_DIOPTERS: 42.25
OS_K2POWER_DIOPTERS: 48.25
OD_AXISANGLE_DEGREES: 006
OD_K1POWER_DIOPTERS: 43.00
OD_K2POWER_DIOPTERS: 7.50
OS_AXISANGLE_DEGREES: 170

## 2025-08-26 ASSESSMENT — REFRACTION_CURRENTRX
OS_OVR_VA: 20/
OS_VPRISM_DIRECTION: SV
OD_AXIS: 105
OD_OVR_VA: 20/
OD_CYLINDER: -5.25
OD_SPHERE: +1.25
OS_CYLINDER: -6.00
OS_SPHERE: +1.50
OD_VPRISM_DIRECTION: SV
OS_AXIS: 073

## 2025-08-26 ASSESSMENT — VISUAL ACUITY
OS_BCVA: 20/40-2
OD_BCVA: 20/40

## 2025-08-26 ASSESSMENT — TONOMETRY: OD_IOP_MMHG: 12

## 2025-08-26 ASSESSMENT — CONFRONTATIONAL VISUAL FIELD TEST (CVF)
OS_FINDINGS: FULL
OD_FINDINGS: FULL